# Patient Record
Sex: MALE | Race: WHITE | NOT HISPANIC OR LATINO | Employment: OTHER | URBAN - METROPOLITAN AREA
[De-identification: names, ages, dates, MRNs, and addresses within clinical notes are randomized per-mention and may not be internally consistent; named-entity substitution may affect disease eponyms.]

---

## 2025-07-24 ENCOUNTER — APPOINTMENT (OUTPATIENT)
Dept: RADIOLOGY | Facility: HOSPITAL | Age: OVER 89
End: 2025-07-24
Payer: MEDICARE

## 2025-07-24 ENCOUNTER — APPOINTMENT (OUTPATIENT)
Dept: CARDIOLOGY | Facility: HOSPITAL | Age: OVER 89
End: 2025-07-24
Payer: MEDICARE

## 2025-07-24 ENCOUNTER — HOSPITAL ENCOUNTER (EMERGENCY)
Facility: HOSPITAL | Age: OVER 89
Discharge: HOME | End: 2025-07-24
Attending: EMERGENCY MEDICINE
Payer: MEDICARE

## 2025-07-24 VITALS
OXYGEN SATURATION: 98 % | HEART RATE: 102 BPM | TEMPERATURE: 97.7 F | DIASTOLIC BLOOD PRESSURE: 83 MMHG | WEIGHT: 145 LBS | RESPIRATION RATE: 20 BRPM | SYSTOLIC BLOOD PRESSURE: 134 MMHG | BODY MASS INDEX: 19.64 KG/M2 | HEIGHT: 72 IN

## 2025-07-24 DIAGNOSIS — F03.911 AGITATION DUE TO DEMENTIA (MULTI): Primary | ICD-10-CM

## 2025-07-24 LAB
ALBUMIN SERPL BCP-MCNC: 3.8 G/DL (ref 3.4–5)
ALP SERPL-CCNC: 57 U/L (ref 33–136)
ALT SERPL W P-5'-P-CCNC: 12 U/L (ref 10–52)
ANION GAP SERPL CALC-SCNC: 14 MMOL/L (ref 10–20)
APPEARANCE UR: CLEAR
AST SERPL W P-5'-P-CCNC: 20 U/L (ref 9–39)
BACTERIA #/AREA URNS AUTO: ABNORMAL /HPF
BASOPHILS # BLD AUTO: 0.05 X10*3/UL (ref 0–0.1)
BASOPHILS NFR BLD AUTO: 0.6 %
BILIRUB SERPL-MCNC: 2.4 MG/DL (ref 0–1.2)
BILIRUB UR STRIP.AUTO-MCNC: NEGATIVE MG/DL
BNP SERPL-MCNC: 449 PG/ML (ref 0–99)
BUN SERPL-MCNC: 16 MG/DL (ref 6–23)
CALCIUM SERPL-MCNC: 9 MG/DL (ref 8.6–10.3)
CARDIAC TROPONIN I PNL SERPL HS: 6 NG/L (ref 0–20)
CHLORIDE SERPL-SCNC: 100 MMOL/L (ref 98–107)
CO2 SERPL-SCNC: 28 MMOL/L (ref 21–32)
COLOR UR: YELLOW
CREAT SERPL-MCNC: 0.6 MG/DL (ref 0.5–1.3)
EGFRCR SERPLBLD CKD-EPI 2021: >90 ML/MIN/1.73M*2
EOSINOPHIL # BLD AUTO: 0.06 X10*3/UL (ref 0–0.4)
EOSINOPHIL NFR BLD AUTO: 0.7 %
ERYTHROCYTE [DISTWIDTH] IN BLOOD BY AUTOMATED COUNT: 13.5 % (ref 11.5–14.5)
GLUCOSE SERPL-MCNC: 98 MG/DL (ref 74–99)
GLUCOSE UR STRIP.AUTO-MCNC: NORMAL MG/DL
HCT VFR BLD AUTO: 43.9 % (ref 41–52)
HGB BLD-MCNC: 14.6 G/DL (ref 13.5–17.5)
IMM GRANULOCYTES # BLD AUTO: 0.03 X10*3/UL (ref 0–0.5)
IMM GRANULOCYTES NFR BLD AUTO: 0.3 % (ref 0–0.9)
KETONES UR STRIP.AUTO-MCNC: ABNORMAL MG/DL
LEUKOCYTE ESTERASE UR QL STRIP.AUTO: NEGATIVE
LYMPHOCYTES # BLD AUTO: 1.62 X10*3/UL (ref 0.8–3)
LYMPHOCYTES NFR BLD AUTO: 18.2 %
MAGNESIUM SERPL-MCNC: 2.15 MG/DL (ref 1.6–2.4)
MCH RBC QN AUTO: 31.5 PG (ref 26–34)
MCHC RBC AUTO-ENTMCNC: 33.3 G/DL (ref 32–36)
MCV RBC AUTO: 95 FL (ref 80–100)
MONOCYTES # BLD AUTO: 0.98 X10*3/UL (ref 0.05–0.8)
MONOCYTES NFR BLD AUTO: 11 %
MUCOUS THREADS #/AREA URNS AUTO: ABNORMAL /LPF
NEUTROPHILS # BLD AUTO: 6.15 X10*3/UL (ref 1.6–5.5)
NEUTROPHILS NFR BLD AUTO: 69.2 %
NITRITE UR QL STRIP.AUTO: NEGATIVE
NRBC BLD-RTO: 0 /100 WBCS (ref 0–0)
PH UR STRIP.AUTO: 5.5 [PH]
PLATELET # BLD AUTO: 202 X10*3/UL (ref 150–450)
POTASSIUM SERPL-SCNC: 4.1 MMOL/L (ref 3.5–5.3)
PROT SERPL-MCNC: 6.7 G/DL (ref 6.4–8.2)
PROT UR STRIP.AUTO-MCNC: NEGATIVE MG/DL
Q ONSET: 228 MS
QRS COUNT: 18 BEATS
QRS DURATION: 78 MS
QT INTERVAL: 336 MS
QTC CALCULATION(BAZETT): 450 MS
QTC FREDERICIA: 408 MS
R AXIS: -67 DEGREES
RBC # BLD AUTO: 4.63 X10*6/UL (ref 4.5–5.9)
RBC # UR STRIP.AUTO: ABNORMAL MG/DL
RBC #/AREA URNS AUTO: >20 /HPF
SODIUM SERPL-SCNC: 138 MMOL/L (ref 136–145)
SP GR UR STRIP.AUTO: 1.02
T AXIS: 86 DEGREES
T OFFSET: 396 MS
UROBILINOGEN UR STRIP.AUTO-MCNC: NORMAL MG/DL
VENTRICULAR RATE: 108 BPM
WBC # BLD AUTO: 8.9 X10*3/UL (ref 4.4–11.3)
WBC #/AREA URNS AUTO: ABNORMAL /HPF

## 2025-07-24 PROCEDURE — 36415 COLL VENOUS BLD VENIPUNCTURE: CPT

## 2025-07-24 PROCEDURE — 93005 ELECTROCARDIOGRAM TRACING: CPT

## 2025-07-24 PROCEDURE — 71045 X-RAY EXAM CHEST 1 VIEW: CPT | Performed by: STUDENT IN AN ORGANIZED HEALTH CARE EDUCATION/TRAINING PROGRAM

## 2025-07-24 PROCEDURE — 70450 CT HEAD/BRAIN W/O DYE: CPT | Performed by: RADIOLOGY

## 2025-07-24 PROCEDURE — 99285 EMERGENCY DEPT VISIT HI MDM: CPT | Mod: 25 | Performed by: EMERGENCY MEDICINE

## 2025-07-24 PROCEDURE — 2500000004 HC RX 250 GENERAL PHARMACY W/ HCPCS (ALT 636 FOR OP/ED)

## 2025-07-24 PROCEDURE — 80053 COMPREHEN METABOLIC PANEL: CPT

## 2025-07-24 PROCEDURE — 85025 COMPLETE CBC W/AUTO DIFF WBC: CPT

## 2025-07-24 PROCEDURE — 96361 HYDRATE IV INFUSION ADD-ON: CPT

## 2025-07-24 PROCEDURE — 83735 ASSAY OF MAGNESIUM: CPT

## 2025-07-24 PROCEDURE — 51798 US URINE CAPACITY MEASURE: CPT

## 2025-07-24 PROCEDURE — 96360 HYDRATION IV INFUSION INIT: CPT

## 2025-07-24 PROCEDURE — 72125 CT NECK SPINE W/O DYE: CPT | Performed by: RADIOLOGY

## 2025-07-24 PROCEDURE — 84484 ASSAY OF TROPONIN QUANT: CPT

## 2025-07-24 PROCEDURE — 2500000005 HC RX 250 GENERAL PHARMACY W/O HCPCS

## 2025-07-24 PROCEDURE — 2500000001 HC RX 250 WO HCPCS SELF ADMINISTERED DRUGS (ALT 637 FOR MEDICARE OP)

## 2025-07-24 PROCEDURE — 71045 X-RAY EXAM CHEST 1 VIEW: CPT

## 2025-07-24 PROCEDURE — 81001 URINALYSIS AUTO W/SCOPE: CPT

## 2025-07-24 PROCEDURE — 70450 CT HEAD/BRAIN W/O DYE: CPT

## 2025-07-24 PROCEDURE — 72125 CT NECK SPINE W/O DYE: CPT

## 2025-07-24 PROCEDURE — 83880 ASSAY OF NATRIURETIC PEPTIDE: CPT | Performed by: EMERGENCY MEDICINE

## 2025-07-24 PROCEDURE — 99285 EMERGENCY DEPT VISIT HI MDM: CPT | Performed by: EMERGENCY MEDICINE

## 2025-07-24 RX ORDER — PREDNISOLONE ACETATE 10 MG/ML
1 SUSPENSION/ DROPS OPHTHALMIC DAILY
COMMUNITY

## 2025-07-24 RX ORDER — DILTIAZEM HYDROCHLORIDE 180 MG/1
180 CAPSULE, COATED, EXTENDED RELEASE ORAL ONCE
Status: COMPLETED | OUTPATIENT
Start: 2025-07-24 | End: 2025-07-24

## 2025-07-24 RX ORDER — FLUOXETINE 10 MG/1
10 CAPSULE ORAL DAILY
COMMUNITY

## 2025-07-24 RX ORDER — GABAPENTIN 100 MG/1
200 CAPSULE ORAL NIGHTLY
COMMUNITY

## 2025-07-24 RX ORDER — DILTIAZEM HYDROCHLORIDE 180 MG/1
180 CAPSULE, COATED, EXTENDED RELEASE ORAL DAILY
COMMUNITY

## 2025-07-24 RX ORDER — FLUOXETINE 10 MG/1
10 CAPSULE ORAL ONCE
Status: COMPLETED | OUTPATIENT
Start: 2025-07-24 | End: 2025-07-24

## 2025-07-24 RX ADMIN — FLUOXETINE 10 MG: 10 CAPSULE ORAL at 16:01

## 2025-07-24 RX ADMIN — SODIUM CHLORIDE 1000 ML: 0.9 INJECTION, SOLUTION INTRAVENOUS at 14:22

## 2025-07-24 RX ADMIN — Medication: at 13:10

## 2025-07-24 RX ADMIN — APIXABAN 5 MG: 5 TABLET, FILM COATED ORAL at 16:01

## 2025-07-24 RX ADMIN — DILTIAZEM HYDROCHLORIDE 180 MG: 180 CAPSULE, COATED, EXTENDED RELEASE ORAL at 16:01

## 2025-07-24 SDOH — HEALTH STABILITY: MENTAL HEALTH: WISH TO BE DEAD (PAST 1 MONTH): NO

## 2025-07-24 SDOH — HEALTH STABILITY: MENTAL HEALTH: SUICIDAL BEHAVIOR (LIFETIME): NO

## 2025-07-24 SDOH — HEALTH STABILITY: MENTAL HEALTH: IN THE PAST FEW WEEKS, HAVE YOU WISHED YOU WERE DEAD?: NO

## 2025-07-24 SDOH — HEALTH STABILITY: MENTAL HEALTH: NON-SPECIFIC ACTIVE SUICIDAL THOUGHTS (PAST 1 MONTH): NO

## 2025-07-24 SDOH — HEALTH STABILITY: MENTAL HEALTH: IN THE PAST WEEK, HAVE YOU BEEN HAVING THOUGHTS ABOUT KILLING YOURSELF?: NO

## 2025-07-24 SDOH — HEALTH STABILITY: MENTAL HEALTH: ARE YOU HAVING THOUGHTS OF KILLING YOURSELF RIGHT NOW?: NO

## 2025-07-24 SDOH — HEALTH STABILITY: MENTAL HEALTH: IN THE PAST FEW WEEKS, HAVE YOU FELT THAT YOU OR YOUR FAMILY WOULD BE BETTER OFF IF YOU WERE DEAD?: NO

## 2025-07-24 SDOH — HEALTH STABILITY: MENTAL HEALTH: ANXIETY SYMPTOMS: GENERALIZED

## 2025-07-24 SDOH — HEALTH STABILITY: MENTAL HEALTH: HAVE YOU EVER TRIED TO KILL YOURSELF?: NO

## 2025-07-24 SDOH — HEALTH STABILITY: MENTAL HEALTH: DEPRESSION SYMPTOMS: APPETITE CHANGE

## 2025-07-24 SDOH — ECONOMIC STABILITY: HOUSING INSECURITY: FEELS SAFE LIVING IN HOME: YES

## 2025-07-24 ASSESSMENT — LIFESTYLE VARIABLES
HAVE PEOPLE ANNOYED YOU BY CRITICIZING YOUR DRINKING: NO
EVER HAD A DRINK FIRST THING IN THE MORNING TO STEADY YOUR NERVES TO GET RID OF A HANGOVER: NO
EVER FELT BAD OR GUILTY ABOUT YOUR DRINKING: NO
PRESCIPTION_ABUSE_PAST_12_MONTHS: NO
HAVE YOU EVER FELT YOU SHOULD CUT DOWN ON YOUR DRINKING: NO
SUBSTANCE_ABUSE_PAST_12_MONTHS: NO
TOTAL SCORE: 0

## 2025-07-24 ASSESSMENT — PAIN SCALES - GENERAL: PAINLEVEL_OUTOF10: 0 - NO PAIN

## 2025-07-24 ASSESSMENT — PAIN - FUNCTIONAL ASSESSMENT: PAIN_FUNCTIONAL_ASSESSMENT: 0-10

## 2025-07-24 NOTE — PROGRESS NOTES
EPAT - Social Work Psychiatric Assessment    Arrival Details  Mode of Arrival: Ambulatory  Admission Source: Home  Admission Type: Involuntary  EPAT Assessment Start Date: 07/24/25  EPAT Assessment Start Time: 1350  Name of : Micheal Ayala    History of Present Illness  Admission Reason: Confusion  HPI: Patient is a 92 year old male with Alzheimer's/Dementia and confusion. His nurse was at the home today and concerned as the patient has been sleeping all day, his ADL's have decreased and he is more dependent on others. The patient lives with his wife and son who help care for him. There is no concern about self harm or harming others. A review of his Triage, Provider and [place] was conducted.    SW Readmission Information   Readmission within 30 Days: No    Psychiatric Symptoms  Anxiety Symptoms: Generalized  Depression Symptoms: Appetite change  Ghazala Symptoms: No problems reported or observed.    Psychosis Symptoms  Hallucination Type: No problems reported or observed.  Delusion Type: No problems reported or observed.    Additional Symptoms - Adult  Generalized Anxiety Disorder: Excessive anxiety/worry  Obsessive Compulsive Disorder: No problems reported or observed.  Panic Attack: No problems reported or observed.  Post Traumatic Stress Disorder: No problems reported or observed.  Delirium: No problems reported or observed.  Review of Symptoms Comments: Please see above.    Past Psychiatric History/Meds/Treatments  Past Psychiatric History: Patient has a history of Dementia and Anxiety. He does not have any mental health providers but does have a PCP that coordinates his care. He has no history of self harm or harming others.  Past Psychiatric Meds/Treatments: n/a  Past Violence/Victimization History: none    Current Mental Health Contacts   Name/Phone Number: none   Last Appointment Date: none  Provider Name/Phone Number: none  Provider Last Appointment Date: none    Support  System: Immediate family    Living Arrangement: House    Home Safety  Feels Safe Living in Home: Yes         [place] Service/Education History  Current or Previous  Service: Active duty, past   Experience: Other (Comment)  Education Level: High school  History of Learning Problems: No  History of School Behavior Problems: No  School History: see above    Social/Cultural History  Social History: patient is his own guardian.  Important Activities: Family    Legal  Legal Concerns: none    Drug Screening  Have you used any substances (canabis, cocaine, heroin, hallucinogens, inhalants, etc.) in the past 12 months?: No  Have you used any prescription drugs other than prescribed in the past 12 months?: No  Is a toxicology screen needed?: No         Behavioral Health  Behavioral Health(WDL): Exceptions to WDL  Behaviors/Mood: Anxious, Cooperative  Affect: Inconsistent with mood  Parent/Guardian/Significant Other Involvement: Attentive to patient needs  Family Behaviors: Appropriate for situation  Visitor Behaviors: Appropriate for situation         General Appearance  Motor Activity: Unremarkable  Speech Pattern: Pressured  General Attitude: Cooperative  Appearance/Hygiene: Disheveled    Thought Process  Coherency: Disorganized  Content: Unremarkable  Delusions: Other (Comment)  Perception: Not altered  Hallucination: None  Judgment/Insight:  (Fair)  Confusion: None    Sleep Pattern  Sleep Pattern: Naps during the day, Sleeps all night    Risk Factors  Self Harm/Suicidal Ideation Plan: none  Previous Self Harm/Suicidal Plans: none  Risk Factors: None    Violence Risk Assessment  Assessment of Violence: None noted  Thoughts of Harm to Others: No    Ability to Assess Risk Screen  Risk Screen - Ability to Assess: Able to be screened  Ask Suicide-Screening Questions  1. In the past few weeks, have you wished you were dead?: No  2. In the past few weeks, have you felt that you or your family would be better off  if you were dead?: No  3. In the past week, have you been having thoughts about killing yourself?: No  4. Have you ever tried to kill yourself?: No  5. Are you having thoughts of killing yourself right now?: No  Calculated Risk Score: No intervention is necessary  [place] Suicide Severity Rating Scale (Screener/Recent Self-Report)  1. Wish to be Dead (Past 1 Month): No  2. Non-Specific Active Suicidal Thoughts (Past 1 Month): No  6. Suicidal Behavior (Lifetime): No  Calculated C-SSRS Risk Score (Lifetime/Recent): No Risk Indicated  Step 1: Risk Factors  Current & Past Psychiatric Dx: Mood disorder  Presenting Symptoms: Anxiety and/or panic  Family History: Other (Comment)  Precipitants/Stressors: Triggering events leading to humiliation, shame, and/or despair (e.g. loss of relationship, financial or health status) (real or anticipated)  Change in Treatment: Non-compliant or not receiving treatment  Access to Lethal Methods : No  Step 2: Protective Factors   Protective Factors Internal: Identifies reasons for living  Protective Factors External: Cultural, spiritual and/or moral attitudes against suicide, Supportive social network or family or friends, Positive therapeutic relationships  Step 3: Suicidal Ideation Intensity  How Many Times Have You Had These Thoughts: Less than once a week  When You Have the Thoughts How Long do They Last : Fleeting - few seconds or minutes  Could/Can You Stop Thinking About Killing Yourself or Wanting to Die if You Want to: Does not attempt to control thoughts  Are There Things - Anyone or Anything - That Stopped You From Wanting to Die or Acting on: Does not apply  What Sort of Reasons Did You Have For Thinking About Wanting to Die or Killing Yourself: Does not apply  Total Score: 2  Step 5: Documentation  Risk Level: Low suicide risk (Patient is low risk to self. Dr. Fabian agrees.)    Psychiatric Impression and Plan of Care  Assessment and Plan: Patient is a 92 year old male who  "presented to the ED with his wife. Earlier today the patient was visited by his nurse. She comes monthly. There has been concern about his decline over the pat 5 weeks. The patient has been diagnosed with Alzheimer's/Dementia. Over the past few weeks he has not been sleeping, eating less, unable to complete ADL's without full assist. He is more confused and disoriented. The patient has also fallen 4 times in the past week. He has no history of mental health concerns. He was calm and cooperative in the assessment. He has no known history of agitation or harm towards others. A discussion took place with his wife. She shared the patient is \"the nicest\". She confirmed no history of self harm or harming others. She shared currently a nurse calls the home twice a week and comes monthly for follow ups. She shared the patient has stopped taking his medications. Overall the patient would not benefit from inpatient mental health care. The patient and his family will follow up with his PCP.  Specific Resources Provided to Patient: none  CM Notified: no  PHP/IOP Recommended: none  Specific Information Provided for PHP/IOP: none  Plan Comments: discharge    Outcome/Disposition  Patient's Perception of Outcome Achieved: n/a  Assessment, Recommendations and Risk Level Reviewed with: discharge  Contact Name: Mrs. Coelho  Contact Number(s): [phone]  Contact Relationship: spouse  EPAT Assessment Completed Date: 07/24/25  EPAT Assessment Completed Time: 0525  Patient Disposition: Home    Social Work Note  "

## 2025-07-24 NOTE — PROGRESS NOTES
I have had a discussion with the patient about warning signs that their condition is worsening, and they should consider returning to the ED and/or calling 911    The patient has identified appropriate internal coping strategies and has people and social settings that provide distraction and support.    The patient has a person who they can talk to in a crisis.  I have offered to contact this individual  Yes - Spoke with the patient's wife.

## 2025-07-24 NOTE — ED TRIAGE NOTES
Per EMS, family endorses concerns regarding increased confusion and generalized weakness. Patient has HX of dementia. Patient confused to his  and current year. Patient endorses confusion.

## 2025-07-24 NOTE — DISCHARGE INSTRUCTIONS
Shawn was seen in the Emergency Department (ED) for agitation and worsening memory.     Labs are negative for electrolyte abnormality, urine infection, or heart attack.  CT imaging negative for bleed or mass.    Please follow-up with neurology regarding further management of agitation and cognitive function.    Seek immediate medical attention should you have:  fevers of 38C (100.4) or higher, shortness of breath, chest pain, weakness, confusion, vomiting, or any worsening or concerning symptoms.

## 2025-07-24 NOTE — ED PROVIDER NOTES
Emergency Department Provider Note       History of Present Illness     History provided by: Patient, Family Member, and Significant Other  Limitations to History: Dementia  External Records Reviewed with Brief Summary: None    HPI:  Shawn Coelho is a 92 y.o. male with a PMH of Alzheimer's dementia, COPD on 2L O2 at baseline, HTN, atrial flutter on Eliquis, and OA presenting to the ED with complaint of worsening agitation and slurred speech.  Wife and son reports over the past 3-4 days he has had progressively worsening agitation and slurred speech in which he is refusing his medications and frequently attempting to get out of bed or pull off his oxygen.  Family's home nurse recommended he go to the ED for evaluation for organic etiology.  Notes that he fell out of the bed and hit his left scalp 4 days ago and he was not evaluated for this.  Denies pain, fevers, cough, shortness of breath, leg swelling, vomiting, or diarrhea.    Physical Exam   Triage vitals:  T 36.5 °C (97.7 °F)  HR 99  /73  RR 20  O2 96 % Supplemental oxygen    General: Awake, alert, in no acute distress  Eyes: Gaze conjugate.  No scleral icterus or injection.  HENT: Normo-cephalic.  Abrasion to left parietal scalp.  No stridor  CV: Regular rate, regular rhythm. Radial pulses 2+ bilaterally  Resp: Breathing non-labored, speaking in full sentences.  Clear to auscultation bilaterally  GI: Soft, non-distended, non-tender. No rebound or guarding.  MSK/Extremities: No gross bony deformities. Moving all extremities.  No spinal tenderness.  5/5 strength of the extremities.  Skin: Warm. Appropriate color  Neuro: Alert. Oriented to person and place but not time or event. Face symmetric. Speech is fluent.  Gross strength and sensation intact in b/l UE and LEs  Psych: Appropriate mood and affect      Medical Decision Making & ED Course   Medical Decision Makin y.o. male with a history significant for Alzheimer's dementia ANO x 2 at baseline,  atrial flutter on Eliquis, and COPD on 2L O2 at baseline evaluated in the ED for worsening agitation and slurred speech over the past 3-4 days as well as a fall and head injury 4 days ago.  Patient is afebrile, sinus, normotensive, saturating well on room air, and in no acute distress.  On exam patient has an abrasion to the left parietal scalp.  Cardiac workup, UA, BNP, and CT head and C-spine ordered.  1L of NS provided.  ----  Differential diagnoses considered include but are not limited to: ACS (STEMI/NSTEMI), infection (UTI, PNA, cellulitis), CHF, Dehydration, Anemia, CVA, Hypoglycemia, Hypo/Hypernatremia, Hypocalcemia, Uremia, Hypoxia, Malignancy,TBI, Delirium, Dementia    Social Determinants of Health which Significantly Impact Care: Social Determinants of Health which Significantly Impact Care: None identified     EKG Independent Interpretation: EKG interpreted by myself. Please see ED Course for full interpretation.    Independent Result Review and Interpretation: Relevant laboratory and radiographic results were reviewed and independently interpreted by myself.  As necessary, they are commented on in the ED Course.    Chronic conditions affecting the patient's care: As documented above in MDM    The patient was discussed with the following consultants/services: EPAT regarding agitation management    Care Considerations: As documented above in [facility]    ED Course:  ED Course as of 07/24/25 2243   Thu Jul 24, 2025   1317 ECG 12 lead  ECG, obtained due to weakness, per my read, with atrial fibrillation with PVCs, heart rate 108 bpm, left axis and normal intervals, no T wave inversions, no ST segment abnormalities. No change from prior ECG in 2023. [MB]   1418 I spoke with the EPAT who reports from a psychiatric standpoint they would not recommend urgent interventions at this time.  States that he would benefit from follow-up with PCP and/or neurologist for medication changes.  States that if family have  difficulty providing meds they can discuss that with one of their established providers but patient would not benefit from placement at this time. [ES]   1437 CT head wo IV contrast  Negative for ICH, mass, or ventriculomegaly. [ES]   1439 CT cervical spine wo IV contrast  Negative for unstable C-spine fracture. [ES]   1439 Patient's home meds, Eliquis, Cardizem, and Prozac, ordered to trial p.o. [ES]   1731 Urinalysis with Reflex Culture and Microscopic(!)  UA negative for UTI. [ES]   1739 Patient and family updated on results.  They feel comfortable returning home.  Patient has been mobile at his baseline with a walker.  They will follow-up with neurology for further management.  Advised to utilize socks or mittens while in bed to help prevent patient from pulling at medical devices.  They are agreeable. [ES]      ED Course User Index  [ES] Trevor Fabian MD  [MB] Nicole Robles MD         Diagnoses as of 07/24/25 2243   Agitation due to dementia (Multi)       Disposition   As a result of the work-up, the patient was discharged home.  he was informed of his diagnosis and instructed to come back with any concerns or worsening of condition.  he and was agreeable to the plan as discussed above.  he was given the opportunity to ask questions.  All of the patient's questions were answered.    Procedures   Procedures    This was a shared visit with an ED attending.  The patient was seen and discussed with the ED attending    Trevor Fabian MD  Emergency Medicine                                                       Trevor Fabian MD  Resident  07/24/25 1658

## 2025-07-25 LAB
HOLD SPECIMEN: NORMAL
HOLD SPECIMEN: NORMAL

## 2025-08-31 LAB
Q ONSET: 228 MS
QRS COUNT: 18 BEATS
QRS DURATION: 78 MS
QT INTERVAL: 336 MS
QTC CALCULATION(BAZETT): 450 MS
QTC FREDERICIA: 408 MS
R AXIS: -67 DEGREES
T AXIS: 86 DEGREES
T OFFSET: 396 MS
VENTRICULAR RATE: 108 BPM